# Patient Record
Sex: MALE | Race: ASIAN | NOT HISPANIC OR LATINO | Employment: UNEMPLOYED | URBAN - METROPOLITAN AREA
[De-identification: names, ages, dates, MRNs, and addresses within clinical notes are randomized per-mention and may not be internally consistent; named-entity substitution may affect disease eponyms.]

---

## 2019-08-12 ENCOUNTER — RECORDS - HEALTHEAST (OUTPATIENT)
Dept: LAB | Facility: CLINIC | Age: 39
End: 2019-08-12

## 2019-08-12 LAB
ALBUMIN SERPL-MCNC: 4.3 G/DL (ref 3.5–5)
ALP SERPL-CCNC: 70 U/L (ref 45–120)
ALT SERPL W P-5'-P-CCNC: 46 U/L (ref 0–45)
ANION GAP SERPL CALCULATED.3IONS-SCNC: 7 MMOL/L (ref 5–18)
AST SERPL W P-5'-P-CCNC: 25 U/L (ref 0–40)
BILIRUB SERPL-MCNC: 0.6 MG/DL (ref 0–1)
BUN SERPL-MCNC: 14 MG/DL (ref 8–22)
CALCIUM SERPL-MCNC: 10 MG/DL (ref 8.5–10.5)
CHLORIDE BLD-SCNC: 108 MMOL/L (ref 98–107)
CHOLEST SERPL-MCNC: 230 MG/DL
CO2 SERPL-SCNC: 25 MMOL/L (ref 22–31)
CREAT SERPL-MCNC: 0.94 MG/DL (ref 0.7–1.3)
FASTING STATUS PATIENT QL REPORTED: ABNORMAL
GFR SERPL CREATININE-BSD FRML MDRD: >60 ML/MIN/1.73M2
GLUCOSE BLD-MCNC: 107 MG/DL (ref 70–125)
HDLC SERPL-MCNC: 39 MG/DL
LDLC SERPL CALC-MCNC: 137 MG/DL
POTASSIUM BLD-SCNC: 4 MMOL/L (ref 3.5–5)
PROT SERPL-MCNC: 7.6 G/DL (ref 6–8)
SODIUM SERPL-SCNC: 140 MMOL/L (ref 136–145)
TRIGL SERPL-MCNC: 272 MG/DL

## 2021-02-18 ENCOUNTER — RECORDS - HEALTHEAST (OUTPATIENT)
Dept: LAB | Facility: CLINIC | Age: 41
End: 2021-02-18

## 2021-02-18 LAB
ALBUMIN SERPL-MCNC: 4 G/DL (ref 3.5–5)
ALP SERPL-CCNC: 76 U/L (ref 45–120)
ALT SERPL W P-5'-P-CCNC: 48 U/L (ref 0–45)
ANION GAP SERPL CALCULATED.3IONS-SCNC: 13 MMOL/L (ref 5–18)
AST SERPL W P-5'-P-CCNC: 23 U/L (ref 0–40)
BASOPHILS # BLD AUTO: 0 THOU/UL (ref 0–0.2)
BASOPHILS NFR BLD AUTO: 0 % (ref 0–2)
BILIRUB SERPL-MCNC: 0.8 MG/DL (ref 0–1)
BUN SERPL-MCNC: 18 MG/DL (ref 8–22)
C REACTIVE PROTEIN LHE: 0.6 MG/DL (ref 0–0.8)
CALCIUM SERPL-MCNC: 9.5 MG/DL (ref 8.5–10.5)
CHLORIDE BLD-SCNC: 105 MMOL/L (ref 98–107)
CHOLEST SERPL-MCNC: 212 MG/DL
CO2 SERPL-SCNC: 21 MMOL/L (ref 22–31)
CREAT SERPL-MCNC: 0.97 MG/DL (ref 0.7–1.3)
EOSINOPHIL # BLD AUTO: 0.1 THOU/UL (ref 0–0.4)
EOSINOPHIL NFR BLD AUTO: 1 % (ref 0–6)
ERYTHROCYTE [DISTWIDTH] IN BLOOD BY AUTOMATED COUNT: 11.6 % (ref 11–14.5)
ERYTHROCYTE [SEDIMENTATION RATE] IN BLOOD BY WESTERGREN METHOD: 32 MM/HR (ref 0–15)
FASTING STATUS PATIENT QL REPORTED: ABNORMAL
GFR SERPL CREATININE-BSD FRML MDRD: >60 ML/MIN/1.73M2
GLUCOSE BLD-MCNC: 100 MG/DL (ref 70–125)
HCT VFR BLD AUTO: 43.7 % (ref 40–54)
HDLC SERPL-MCNC: 42 MG/DL
HGB BLD-MCNC: 14.8 G/DL (ref 14–18)
IMM GRANULOCYTES # BLD: 0 THOU/UL
IMM GRANULOCYTES NFR BLD: 0 %
LDLC SERPL CALC-MCNC: 135 MG/DL
LYMPHOCYTES # BLD AUTO: 1.9 THOU/UL (ref 0.8–4.4)
LYMPHOCYTES NFR BLD AUTO: 20 % (ref 20–40)
MCH RBC QN AUTO: 32.1 PG (ref 27–34)
MCHC RBC AUTO-ENTMCNC: 33.9 G/DL (ref 32–36)
MCV RBC AUTO: 95 FL (ref 80–100)
MONOCYTES # BLD AUTO: 0.5 THOU/UL (ref 0–0.9)
MONOCYTES NFR BLD AUTO: 6 % (ref 2–10)
NEUTROPHILS # BLD AUTO: 6.8 THOU/UL (ref 2–7.7)
NEUTROPHILS NFR BLD AUTO: 73 % (ref 50–70)
PLATELET # BLD AUTO: 238 THOU/UL (ref 140–440)
PMV BLD AUTO: 9.6 FL (ref 8.5–12.5)
POTASSIUM BLD-SCNC: 4.1 MMOL/L (ref 3.5–5)
PROT SERPL-MCNC: 7.3 G/DL (ref 6–8)
RBC # BLD AUTO: 4.61 MILL/UL (ref 4.4–6.2)
SODIUM SERPL-SCNC: 139 MMOL/L (ref 136–145)
TRIGL SERPL-MCNC: 175 MG/DL
URATE SERPL-MCNC: 10 MG/DL (ref 3–8)
WBC: 9.3 THOU/UL (ref 4–11)

## 2021-05-06 ENCOUNTER — IMMUNIZATION (OUTPATIENT)
Dept: NURSING | Facility: CLINIC | Age: 41
End: 2021-05-06
Payer: COMMERCIAL

## 2021-05-06 PROCEDURE — 91300 PR COVID VAC PFIZER DIL RECON 30 MCG/0.3 ML IM: CPT

## 2021-05-06 PROCEDURE — 0001A PR COVID VAC PFIZER DIL RECON 30 MCG/0.3 ML IM: CPT

## 2021-05-30 ENCOUNTER — RECORDS - HEALTHEAST (OUTPATIENT)
Dept: ADMINISTRATIVE | Facility: CLINIC | Age: 41
End: 2021-05-30

## 2021-05-31 ENCOUNTER — RECORDS - HEALTHEAST (OUTPATIENT)
Dept: ADMINISTRATIVE | Facility: CLINIC | Age: 41
End: 2021-05-31

## 2022-09-21 ENCOUNTER — LAB REQUISITION (OUTPATIENT)
Dept: LAB | Facility: CLINIC | Age: 42
End: 2022-09-21

## 2022-09-21 DIAGNOSIS — M10.079 IDIOPATHIC GOUT, UNSPECIFIED ANKLE AND FOOT: ICD-10-CM

## 2022-09-21 DIAGNOSIS — E78.2 MIXED HYPERLIPIDEMIA: ICD-10-CM

## 2022-09-21 LAB
ALBUMIN SERPL BCG-MCNC: 4.5 G/DL (ref 3.5–5.2)
ALP SERPL-CCNC: 82 U/L (ref 40–129)
ALT SERPL W P-5'-P-CCNC: 35 U/L (ref 10–50)
ANION GAP SERPL CALCULATED.3IONS-SCNC: 14 MMOL/L (ref 7–15)
AST SERPL W P-5'-P-CCNC: 22 U/L (ref 10–50)
BILIRUB SERPL-MCNC: 0.6 MG/DL
BUN SERPL-MCNC: 18.5 MG/DL (ref 6–20)
CALCIUM SERPL-MCNC: 9.5 MG/DL (ref 8.6–10)
CHLORIDE SERPL-SCNC: 102 MMOL/L (ref 98–107)
CHOLEST SERPL-MCNC: 227 MG/DL
CREAT SERPL-MCNC: 1.39 MG/DL (ref 0.67–1.17)
DEPRECATED HCO3 PLAS-SCNC: 22 MMOL/L (ref 22–29)
GFR SERPL CREATININE-BSD FRML MDRD: 66 ML/MIN/1.73M2
GLUCOSE SERPL-MCNC: 111 MG/DL (ref 70–99)
HDLC SERPL-MCNC: 48 MG/DL
LDLC SERPL CALC-MCNC: 116 MG/DL
NONHDLC SERPL-MCNC: 179 MG/DL
POTASSIUM SERPL-SCNC: 3.9 MMOL/L (ref 3.4–5.3)
PROT SERPL-MCNC: 7.4 G/DL (ref 6.4–8.3)
SODIUM SERPL-SCNC: 138 MMOL/L (ref 136–145)
TRIGL SERPL-MCNC: 315 MG/DL
URATE SERPL-MCNC: 10.2 MG/DL (ref 3.4–7)

## 2022-09-21 PROCEDURE — 84550 ASSAY OF BLOOD/URIC ACID: CPT | Performed by: PHYSICIAN ASSISTANT

## 2022-09-21 PROCEDURE — 80053 COMPREHEN METABOLIC PANEL: CPT | Performed by: PHYSICIAN ASSISTANT

## 2022-09-21 PROCEDURE — 80061 LIPID PANEL: CPT | Performed by: PHYSICIAN ASSISTANT

## 2022-10-26 ENCOUNTER — LAB REQUISITION (OUTPATIENT)
Dept: LAB | Facility: CLINIC | Age: 42
End: 2022-10-26

## 2022-10-26 DIAGNOSIS — E78.2 MIXED HYPERLIPIDEMIA: ICD-10-CM

## 2022-10-26 LAB
ALBUMIN SERPL BCG-MCNC: 4.4 G/DL (ref 3.5–5.2)
ALP SERPL-CCNC: 75 U/L (ref 40–129)
ALT SERPL W P-5'-P-CCNC: 28 U/L (ref 10–50)
ANION GAP SERPL CALCULATED.3IONS-SCNC: 13 MMOL/L (ref 7–15)
AST SERPL W P-5'-P-CCNC: 15 U/L (ref 10–50)
BILIRUB SERPL-MCNC: 0.5 MG/DL
BUN SERPL-MCNC: 15.3 MG/DL (ref 6–20)
CALCIUM SERPL-MCNC: 9.7 MG/DL (ref 8.6–10)
CHLORIDE SERPL-SCNC: 105 MMOL/L (ref 98–107)
CHOLEST SERPL-MCNC: 209 MG/DL
CREAT SERPL-MCNC: 0.9 MG/DL (ref 0.67–1.17)
DEPRECATED HCO3 PLAS-SCNC: 22 MMOL/L (ref 22–29)
GFR SERPL CREATININE-BSD FRML MDRD: >90 ML/MIN/1.73M2
GLUCOSE SERPL-MCNC: 98 MG/DL (ref 70–99)
HDLC SERPL-MCNC: 58 MG/DL
LDLC SERPL CALC-MCNC: 128 MG/DL
NONHDLC SERPL-MCNC: 151 MG/DL
POTASSIUM SERPL-SCNC: 4 MMOL/L (ref 3.4–5.3)
PROT SERPL-MCNC: 7.1 G/DL (ref 6.4–8.3)
SODIUM SERPL-SCNC: 140 MMOL/L (ref 136–145)
TRIGL SERPL-MCNC: 116 MG/DL

## 2022-10-26 PROCEDURE — 80061 LIPID PANEL: CPT | Performed by: PHYSICIAN ASSISTANT

## 2022-10-26 PROCEDURE — 80053 COMPREHEN METABOLIC PANEL: CPT | Performed by: PHYSICIAN ASSISTANT

## 2023-08-23 ENCOUNTER — HOSPITAL ENCOUNTER (EMERGENCY)
Facility: HOSPITAL | Age: 43
Discharge: HOME | End: 2023-08-23
Attending: EMERGENCY MEDICINE | Admitting: EMERGENCY MEDICINE
Payer: COMMERCIAL

## 2023-08-23 VITALS
TEMPERATURE: 97.2 F | BODY MASS INDEX: 30.73 KG/M2 | HEART RATE: 77 BPM | SYSTOLIC BLOOD PRESSURE: 149 MMHG | OXYGEN SATURATION: 97 % | WEIGHT: 180 LBS | DIASTOLIC BLOOD PRESSURE: 95 MMHG | HEIGHT: 64 IN | RESPIRATION RATE: 16 BRPM

## 2023-08-23 DIAGNOSIS — S81.811A LACERATION OF RIGHT LOWER EXTREMITY, INITIAL ENCOUNTER: ICD-10-CM

## 2023-08-23 PROCEDURE — 12001 RPR S/N/AX/GEN/TRNK 2.5CM/<: CPT

## 2023-08-23 PROCEDURE — 250N000009 HC RX 250: Performed by: EMERGENCY MEDICINE

## 2023-08-23 PROCEDURE — 99283 EMERGENCY DEPT VISIT LOW MDM: CPT | Mod: 25

## 2023-08-23 RX ORDER — GINSENG 100 MG
CAPSULE ORAL ONCE
Status: COMPLETED | OUTPATIENT
Start: 2023-08-23 | End: 2023-08-23

## 2023-08-23 RX ADMIN — BACITRACIN: 500 OINTMENT TOPICAL at 22:42

## 2023-08-23 ASSESSMENT — ENCOUNTER SYMPTOMS
NUMBNESS: 0
WOUND: 1

## 2023-08-24 NOTE — ED PROVIDER NOTES
EMERGENCY DEPARTMENT ENCOUNTER      NAME: Sanju Becerra  AGE: 42 year old male  YOB: 1980  MRN: [id number]  EVALUATION DATE & TIME: 8/23/2023  9:38 PM    PCP: No Ref-Primary, Physician    ED PROVIDER: Mike Hinkle M.D.      Chief Complaint   Patient presents with    Laceration         FINAL IMPRESSION:  1. Laceration of right lower extremity, initial encounter          ED COURSE & MEDICAL DECISION MAKING:    Pertinent Labs & Imaging studies reviewed. (See chart for details)  42 year old male presents to the Emergency Department for evaluation of right leg laceration.  Here he had a 1.5cm laceration R lower leg that was closed with ethilong.  TDAP is UTD per patient. He is discharged.          10:05 PM I met with the patient, obtained history, performed an initial exam, and discussed options and plan for diagnostics and treatment here in the ED.  10:29 PM Performed lac repair. We discussed plans for discharge including supportive cares, symptomatic treatment, outpatient follow up, and reasons to return to the emergency department.        At the conclusion of the encounter I discussed the results of all of the tests and the disposition. The questions were answered. The patient or family acknowledged understanding and was agreeable with the care plan.     Medical Decision Making    History:  Supplemental history from: Documented in chart, if applicable  External Record(s) reviewed: Documented in chart, if applicable.    Work Up:  Chart documentation includes differential considered and any EKGs or imaging independently interpreted by provider, where specified.  In additional to work up documented, I considered the following work up: Documented in chart, if applicable.    External consultation:  Discussion of management with another provider: Documented in chart, if applicable    Complicating factors:  Care impacted by chronic illness: N/A  Care affected by social determinants of health:  "N/A    Disposition considerations: Discharge. No recommendations on prescription strength medication(s). See documentation for any additional details.    MEDICATIONS GIVEN IN THE EMERGENCY:  Medications   bacitracin ointment ( Topical $Given 8/23/23 5351)       NEW PRESCRIPTIONS STARTED AT TODAY'S ER VISIT  Discharge Medication List as of 8/23/2023 10:45 PM              =================================================================    HPI    Patient information was obtained from: patient    Use of : N/A        Sanju Becerra is a 42 year old male with no pertinent history who presents to this ED via walk-in for evaluation of laceration.    Yesterday night, around 9:00 pm, the patient was cutting the bathroom wall with a utility knife and had his right leg against the wall for support when the knife slipped and cut his leg. He sustained a laceration to the right calf. Since then, his right leg feels \"stiff\" when bending or walking. He denies numbness, tingling, and any other symptoms. Patient reports he received his last tetanus shot less than 10 years ago.      REVIEW OF SYSTEMS   Review of Systems   Skin:  Positive for wound (right calf laceration).   Neurological:  Negative for numbness.   All other systems reviewed and are negative.       PAST MEDICAL HISTORY:  History reviewed. No pertinent past medical history.    PAST SURGICAL HISTORY:  History reviewed. No pertinent surgical history.        CURRENT MEDICATIONS:    No current facility-administered medications for this encounter.    Current Outpatient Medications:     famotidine (PEPCID) 40 MG tablet, [FAMOTIDINE (PEPCID) 40 MG TABLET] Take 1 tablet (40 mg total) by mouth at bedtime as needed for heartburn., Disp: 20 tablet, Rfl: 0    oxyCODONE (ROXICODONE) 5 MG immediate release tablet, [OXYCODONE (ROXICODONE) 5 MG IMMEDIATE RELEASE TABLET] Take 1 tablet (5 mg total) by mouth every 6 (six) hours as needed for pain., Disp: 12 tablet, Rfl: " "0    ALLERGIES:  No Known Allergies    FAMILY HISTORY:  No family history on file.    SOCIAL HISTORY:   Social History     Socioeconomic History    Marital status:    Tobacco Use    Smoking status: Never    Smokeless tobacco: Never   Substance and Sexual Activity    Alcohol use: Yes     Comment: Alcoholic Drinks/day: recreational     Drug use: No       VITALS:  BP (!) 149/95   Pulse 77   Temp 97.2  F (36.2  C)   Resp 16   Ht 1.626 m (5' 4\")   Wt 81.6 kg (180 lb)   SpO2 97%   BMI 30.90 kg/m      PHYSICAL EXAM    Physical Exam  Constitutional:       General: He is not in acute distress.     Appearance: He is well-developed. He is not diaphoretic.   HENT:      Head: Normocephalic and atraumatic.   Eyes:      General: No scleral icterus.  Musculoskeletal:      Cervical back: Normal range of motion and neck supple.      Comments: There is a 1.5cm linear laceration R lower leg.  Normal distal perfusion and DP pulse   Skin:     General: Skin is warm and dry.      Findings: No rash.   Neurological:      Mental Status: He is alert and oriented to person, place, and time.            LAB:  All pertinent labs reviewed and interpreted.       RADIOLOGY:  I independently interpreted the below imaging.   Please see official radiology report.  No orders to display       PROCEDURES:   PROCEDURE: Laceration Repair   INDICATIONS: Laceration   PROCEDURE PROVIDER: Dr Mike Hinkle   SITE: Right lower leg   TYPE/SIZE: simple, clean, and no foreign body visualized  1.5 cm (total length)   FUNCTIONAL ASSESSMENT: Distal sensation, circulation, and motor intact   MEDICATION: 10 mLs of 1% Lidocaine with epinephrine   PREPARATION: irrigation with Normal saline   DEBRIDEMENT: no debridement   CLOSURE:  Superficial layer closed with 4 stitches of 5-0 Ethilon simple interrupted    Total number of sutures/staples placed: 4             Angela BLANCA, am serving as a scribe to document services personally performed by Dr. Hinkle " based on my observation and the provider's statements to me. I, Mike Hinkle MD attest that Angela Abreu is acting in a scribe capacity, has observed my performance of the services and has documented them in accordance with my direction.    Mike Hinkle M.D.  Emergency Medicine  [facility] EMERGENCY DEPARTMENT  [address] [place] [id number]  [phone]  Dept: [phone]       Mike Hinkle MD  08/24/23 0047

## 2023-08-24 NOTE — DISCHARGE INSTRUCTIONS
Get the stitches out in around 10 days.  Apply antibiotic ointment daily, keep it clean and covered until healed.      Return to the ED for increasing redness, pain, swelling, or other signs of infection.

## 2023-08-24 NOTE — ED TRIAGE NOTES
While cutting the bathroom wall patient had leg up as support. Utility knife slipped hitting his own leg. Injury happened yesterday around 2100. Unsure last tdap.